# Patient Record
Sex: FEMALE | Race: WHITE | Employment: FULL TIME | URBAN - NONMETROPOLITAN AREA
[De-identification: names, ages, dates, MRNs, and addresses within clinical notes are randomized per-mention and may not be internally consistent; named-entity substitution may affect disease eponyms.]

---

## 2017-10-12 ENCOUNTER — HOSPITAL ENCOUNTER (OUTPATIENT)
Dept: WOMENS IMAGING | Age: 52
Discharge: HOME | End: 2017-10-12
Payer: OTHER GOVERNMENT

## 2017-10-12 DIAGNOSIS — Z12.31 ENCOUNTER FOR SCREENING MAMMOGRAM FOR BREAST CANCER: ICD-10-CM

## 2017-10-12 PROCEDURE — G0202 SCR MAMMO BI INCL CAD: HCPCS

## 2018-12-06 ENCOUNTER — HOSPITAL ENCOUNTER (OUTPATIENT)
Dept: WOMENS IMAGING | Age: 53
Discharge: HOME | End: 2018-12-08
Payer: OTHER GOVERNMENT

## 2018-12-06 DIAGNOSIS — Z12.39 BREAST CANCER SCREENING: ICD-10-CM

## 2018-12-06 PROCEDURE — 77067 SCR MAMMO BI INCL CAD: CPT

## 2019-11-06 ENCOUNTER — HOSPITAL ENCOUNTER (OUTPATIENT)
Dept: WOMENS IMAGING | Age: 54
Discharge: HOME | End: 2019-11-08
Payer: OTHER GOVERNMENT

## 2019-11-06 DIAGNOSIS — Z12.39 SCREENING BREAST EXAMINATION: ICD-10-CM

## 2019-11-06 PROCEDURE — 77063 BREAST TOMOSYNTHESIS BI: CPT

## 2020-03-12 PROBLEM — Z12.11 COLON CANCER SCREENING: Status: ACTIVE | Noted: 2020-03-12

## 2020-04-11 PROBLEM — Z12.11 COLON CANCER SCREENING: Status: RESOLVED | Noted: 2020-03-12 | Resolved: 2020-04-11

## 2020-12-01 ENCOUNTER — HOSPITAL ENCOUNTER (OUTPATIENT)
Dept: WOMENS IMAGING | Age: 55
Discharge: HOME | End: 2020-12-03
Payer: OTHER GOVERNMENT

## 2020-12-01 PROCEDURE — 77063 BREAST TOMOSYNTHESIS BI: CPT

## 2021-06-21 ENCOUNTER — HOSPITAL ENCOUNTER (OUTPATIENT)
Dept: GENERAL RADIOLOGY | Age: 56
Discharge: HOME | End: 2021-06-23
Payer: OTHER GOVERNMENT

## 2021-06-21 ENCOUNTER — HOSPITAL ENCOUNTER (OUTPATIENT)
Age: 56
Discharge: HOME | End: 2021-06-23
Payer: OTHER GOVERNMENT

## 2021-06-21 DIAGNOSIS — M79.671 RIGHT FOOT PAIN: ICD-10-CM

## 2021-06-21 PROCEDURE — 73590 X-RAY EXAM OF LOWER LEG: CPT

## 2021-06-21 PROCEDURE — 73630 X-RAY EXAM OF FOOT: CPT

## 2021-11-02 ENCOUNTER — HOSPITAL ENCOUNTER (OUTPATIENT)
Dept: WOMENS IMAGING | Age: 56
Discharge: HOME | End: 2021-11-04
Payer: OTHER GOVERNMENT

## 2021-11-02 DIAGNOSIS — Z12.31 BREAST CANCER SCREENING BY MAMMOGRAM: ICD-10-CM

## 2021-11-02 PROCEDURE — 77067 SCR MAMMO BI INCL CAD: CPT

## 2022-11-07 ENCOUNTER — HOSPITAL ENCOUNTER (OUTPATIENT)
Dept: WOMENS IMAGING | Age: 57
Discharge: HOME | End: 2022-11-09
Payer: OTHER GOVERNMENT

## 2022-11-07 DIAGNOSIS — Z12.31 BREAST CANCER SCREENING BY MAMMOGRAM: ICD-10-CM

## 2022-11-07 PROCEDURE — 77063 BREAST TOMOSYNTHESIS BI: CPT

## 2023-10-25 ENCOUNTER — OFFICE VISIT (OUTPATIENT)
Dept: OBGYN CLINIC | Age: 58
End: 2023-10-25
Payer: OTHER GOVERNMENT

## 2023-10-25 VITALS — BODY MASS INDEX: 17.23 KG/M2 | WEIGHT: 110 LBS | DIASTOLIC BLOOD PRESSURE: 82 MMHG | SYSTOLIC BLOOD PRESSURE: 118 MMHG

## 2023-10-25 DIAGNOSIS — Z01.419 WOMEN'S ANNUAL ROUTINE GYNECOLOGICAL EXAMINATION: Primary | ICD-10-CM

## 2023-10-25 DIAGNOSIS — N95.2 ATROPHIC VAGINITIS: ICD-10-CM

## 2023-10-25 PROCEDURE — 99386 PREV VISIT NEW AGE 40-64: CPT | Performed by: OBSTETRICS & GYNECOLOGY

## 2023-10-25 RX ORDER — ESTRADIOL 10 UG/1
10 INSERT VAGINAL
Qty: 8 TABLET | Refills: 12 | Status: SHIPPED | OUTPATIENT
Start: 2023-10-26

## 2023-10-25 ASSESSMENT — ENCOUNTER SYMPTOMS
DIARRHEA: 0
SHORTNESS OF BREATH: 0
CONSTIPATION: 0
ABDOMINAL PAIN: 0

## 2023-10-25 NOTE — PROGRESS NOTES
YEARLY PHYSICAL    Date of service: 10/25/2023    Mauri Shaw  Is a 56 y.o. female    PT's PCP is: Tiffani Alvarez MD     : 1965                                         Chaperone for Intimate Exam  Chaperone was offered as part of the rooming process. Patient declined and agrees to continue with exam without a chaperone. Chaperone: N/A      Subjective:       No LMP recorded. Patient has had a hysterectomy. Are your menses regular: not applicable    OB History    Para Term  AB Living       0         SAB IAB Ectopic Molar Multiple Live Births                      Social History     Tobacco Use   Smoking Status Never   Smokeless Tobacco Never        Social History     Substance and Sexual Activity   Alcohol Use Yes       Family History   Problem Relation Age of Onset    Breast Cancer Mother     Stroke Mother     Diabetes Father     Heart Surgery Father         5 Bypass. Ovarian Cancer Maternal Aunt        Any family history of breast or ovarian cancer: Yes    Any family history of blood clots: No      Allergies: Patient has no known allergies.       Current Outpatient Medications:     Cholecalciferol (VITAMIN D3) 50 MCG ( [place]) CAPS, 1 tablet, Disp: , Rfl:     levothyroxine (SYNTHROID) 50 MCG tablet, , Disp: , Rfl:     Social History     Substance and Sexual Activity   Sexual Activity Yes       Any bleeding or pain with intercourse: Yes    Last Yearly:  2019    Last pap: s/p TLH     Last Mammogram: 2022    Last Dexascan: Never    Last colorectal screen- type: colonoscopy  date  11-    Do you do self breast exams: Yes    Past Medical History:   Diagnosis Date    Hypothyroidism        Past Surgical History:   Procedure Laterality Date    KAYLIE AND BSO (CERVIX REMOVED)         Family History   Problem Relation Age of Onset    Breast Cancer Mother     Stroke Mother     Diabetes Father     Heart

## 2023-11-08 ENCOUNTER — HOSPITAL ENCOUNTER (OUTPATIENT)
Dept: WOMENS IMAGING | Age: 58
Discharge: HOME | End: 2023-11-10
Payer: OTHER GOVERNMENT

## 2023-11-08 VITALS — WEIGHT: 110 LBS | HEIGHT: 67 IN | BODY MASS INDEX: 17.27 KG/M2

## 2023-11-08 DIAGNOSIS — Z12.31 BREAST CANCER SCREENING BY MAMMOGRAM: ICD-10-CM

## 2023-11-08 PROCEDURE — 77063 BREAST TOMOSYNTHESIS BI: CPT

## 2024-01-02 RX ORDER — ESTRADIOL 10 UG/1
10 INSERT VAGINAL
Qty: 8 TABLET | Refills: 12 | OUTPATIENT
Start: 2024-01-04

## 2024-11-06 ENCOUNTER — TELEPHONE (OUTPATIENT)
Dept: OBGYN CLINIC | Age: 59
End: 2024-11-06

## 2024-11-06 RX ORDER — ESTRADIOL 10 UG/1
10 INSERT VAGINAL
Qty: 8 TABLET | Refills: 12 | OUTPATIENT
Start: 2024-11-07

## 2024-11-06 NOTE — TELEPHONE ENCOUNTER
Pt is requesting a refill of the Vagifem sent to [facility] in [place] to get her through to her annual exam scheduled 2/11/25 in our [place] office.

## 2024-11-07 RX ORDER — ESTRADIOL 10 UG/1
10 INSERT VAGINAL
Qty: 8 TABLET | Refills: 3 | Status: SHIPPED | OUTPATIENT
Start: 2024-11-07

## 2024-11-13 ENCOUNTER — HOSPITAL ENCOUNTER (OUTPATIENT)
Dept: WOMENS IMAGING | Age: 59
Discharge: HOME | End: 2024-11-15
Payer: OTHER GOVERNMENT

## 2024-11-13 VITALS — BODY MASS INDEX: 17.27 KG/M2 | HEIGHT: 67 IN | WEIGHT: 110 LBS

## 2024-11-13 DIAGNOSIS — Z12.31 VISIT FOR SCREENING MAMMOGRAM: ICD-10-CM

## 2024-11-13 PROCEDURE — 77063 BREAST TOMOSYNTHESIS BI: CPT

## 2025-02-11 ENCOUNTER — OFFICE VISIT (OUTPATIENT)
Dept: OBGYN | Age: 60
End: 2025-02-11
Payer: OTHER GOVERNMENT

## 2025-02-11 VITALS
DIASTOLIC BLOOD PRESSURE: 64 MMHG | WEIGHT: 111 LBS | SYSTOLIC BLOOD PRESSURE: 118 MMHG | HEIGHT: 67 IN | BODY MASS INDEX: 17.42 KG/M2

## 2025-02-11 DIAGNOSIS — Z01.419 WOMEN'S ANNUAL ROUTINE GYNECOLOGICAL EXAMINATION: Primary | ICD-10-CM

## 2025-02-11 DIAGNOSIS — N95.2 ATROPHIC VAGINITIS: ICD-10-CM

## 2025-02-11 PROCEDURE — 99396 PREV VISIT EST AGE 40-64: CPT | Performed by: OBSTETRICS & GYNECOLOGY

## 2025-02-11 RX ORDER — ESTRADIOL 10 UG/1
10 INSERT VAGINAL
Qty: 12 TABLET | Refills: 12 | Status: SHIPPED | OUTPATIENT
Start: 2025-02-12

## 2025-02-11 RX ORDER — ESTRADIOL 10 UG/1
10 INSERT VAGINAL
Qty: 8 TABLET | Refills: 3 | Status: SHIPPED | OUTPATIENT
Start: 2025-02-13 | End: 2025-02-11

## 2025-02-11 ASSESSMENT — ENCOUNTER SYMPTOMS
ABDOMINAL PAIN: 0
DIARRHEA: 0
CONSTIPATION: 0
SHORTNESS OF BREATH: 0

## 2025-03-12 RX ORDER — ESTRADIOL 10 UG/1
10 TABLET, FILM COATED VAGINAL
Qty: 12 TABLET | Refills: 12 | Status: SHIPPED | OUTPATIENT
Start: 2025-03-12

## 2025-06-25 PROBLEM — R14.2 BELCHING: Status: ACTIVE | Noted: 2025-06-25

## 2025-06-25 PROBLEM — M67.449 GANGLION CYST OF FINGER: Status: ACTIVE | Noted: 2025-06-25

## 2025-08-06 ENCOUNTER — HOSPITAL ENCOUNTER (OUTPATIENT)
Age: 60
Setting detail: SPECIMEN
Discharge: HOME | End: 2025-08-06
Payer: OTHER GOVERNMENT

## 2025-08-06 PROCEDURE — 87338 HPYLORI STOOL AG IA: CPT

## 2025-08-07 LAB
MICROORGANISM/AGENT SPEC: NEGATIVE
SPECIMEN DESCRIPTION: NORMAL